# Patient Record
Sex: MALE | URBAN - METROPOLITAN AREA
[De-identification: names, ages, dates, MRNs, and addresses within clinical notes are randomized per-mention and may not be internally consistent; named-entity substitution may affect disease eponyms.]

---

## 2022-09-15 ENCOUNTER — OFFICE VISIT (OUTPATIENT)
Dept: URBAN - METROPOLITAN AREA CLINIC 76 | Facility: CLINIC | Age: 42
End: 2022-09-15
Payer: COMMERCIAL

## 2022-09-15 DIAGNOSIS — H10.45 OTHER CHRONIC ALLERGIC CONJUNCTIVITIS: ICD-10-CM

## 2022-09-15 DIAGNOSIS — H52.13 MYOPIA, BILATERAL: Primary | ICD-10-CM

## 2022-09-15 DIAGNOSIS — H31.021 SOLAR RETINOPATHY, RIGHT EYE: ICD-10-CM

## 2022-09-15 PROCEDURE — 92004 COMPRE OPH EXAM NEW PT 1/>: CPT | Performed by: OPTOMETRIST

## 2022-09-15 ASSESSMENT — KERATOMETRY
OD: 44.00
OS: 43.63

## 2022-09-15 ASSESSMENT — VISUAL ACUITY
OD: 20/20
OS: 20/20

## 2022-09-15 ASSESSMENT — INTRAOCULAR PRESSURE
OS: 18
OD: 16

## 2022-09-15 NOTE — IMPRESSION/PLAN
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 Right. possibly secondary to welding? Plan: Observe for now.